# Patient Record
Sex: FEMALE
[De-identification: names, ages, dates, MRNs, and addresses within clinical notes are randomized per-mention and may not be internally consistent; named-entity substitution may affect disease eponyms.]

---

## 2022-03-06 ENCOUNTER — HOSPITAL ENCOUNTER (EMERGENCY)
Dept: HOSPITAL 5 - ED | Age: 52
Discharge: HOME | End: 2022-03-06
Payer: SELF-PAY

## 2022-03-06 VITALS — DIASTOLIC BLOOD PRESSURE: 96 MMHG | SYSTOLIC BLOOD PRESSURE: 150 MMHG

## 2022-03-06 DIAGNOSIS — R60.0: Primary | ICD-10-CM

## 2022-03-06 DIAGNOSIS — R53.81: ICD-10-CM

## 2022-03-06 DIAGNOSIS — N34.2: ICD-10-CM

## 2022-03-06 DIAGNOSIS — R31.29: ICD-10-CM

## 2022-03-06 LAB
BILIRUB UR QL STRIP: (no result)
BLOOD UR QL VISUAL: (no result)
BUN SERPL-MCNC: 16 MG/DL (ref 7–17)
BUN/CREAT SERPL: 27 %
CALCIUM SERPL-MCNC: 9.7 MG/DL (ref 8.4–10.2)
HCT VFR BLD CALC: 36.4 % (ref 30.3–42.9)
HEMOLYSIS INDEX: 9
HGB BLD-MCNC: 12.8 GM/DL (ref 10.1–14.3)
MCHC RBC AUTO-ENTMCNC: 35 % (ref 30–34)
MCV RBC AUTO: 88 FL (ref 79–97)
PH UR STRIP: 6 [PH] (ref 5–7)
PLATELET # BLD: 268 K/MM3 (ref 140–440)
PROT UR STRIP-MCNC: (no result) MG/DL
RBC # BLD AUTO: 4.13 M/MM3 (ref 3.65–5.03)
RBC #/AREA URNS HPF: 8 /HPF (ref 0–6)
UROBILINOGEN UR-MCNC: < 2 MG/DL (ref ?–2)
WBC #/AREA URNS HPF: 1 /HPF (ref 0–6)

## 2022-03-06 PROCEDURE — 80048 BASIC METABOLIC PNL TOTAL CA: CPT

## 2022-03-06 PROCEDURE — 93971 EXTREMITY STUDY: CPT

## 2022-03-06 PROCEDURE — 85027 COMPLETE CBC AUTOMATED: CPT

## 2022-03-06 PROCEDURE — 36415 COLL VENOUS BLD VENIPUNCTURE: CPT

## 2022-03-06 PROCEDURE — 81001 URINALYSIS AUTO W/SCOPE: CPT

## 2022-03-06 PROCEDURE — 99284 EMERGENCY DEPT VISIT MOD MDM: CPT
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
## 2022-03-06 NOTE — VASCULAR LAB REPORT
DUPLEX DOPPLER LOWER EXTREMITY VEINS, RIGHT



INDICATION / CLINICAL INFORMATION:

pain/swelling.



TECHNIQUE:

Duplex doppler imaging was performed through the veins of the right lower extremity using venous comp
ression and other maneuvers.



COMPARISON: 

None available.



FINDINGS:



RIGHT COMMON FEMORAL VEIN: Negative.

RIGHT FEMORAL VEIN: Negative.

RIGHT POPLITEAL VEIN: Negative.

RIGHT CALF VEINS: Negative.



ADDITIONAL FINDINGS: None.



IMPRESSION:

1. No sonographic evidence for DVT in the right lower extremity.



Signer Name: Mikal Wiggins MD 

Signed: 3/6/2022 11:02 AM

Workstation Name: [id number]

## 2022-08-08 ENCOUNTER — HOSPITAL ENCOUNTER (EMERGENCY)
Dept: HOSPITAL 5 - ED | Age: 52
LOS: 1 days | Discharge: LEFT BEFORE DISCHARGE | End: 2022-08-09
Payer: SELF-PAY

## 2022-08-08 VITALS — DIASTOLIC BLOOD PRESSURE: 90 MMHG | SYSTOLIC BLOOD PRESSURE: 130 MMHG

## 2022-08-08 DIAGNOSIS — R10.9: Primary | ICD-10-CM

## 2022-08-08 DIAGNOSIS — Z53.21: ICD-10-CM

## 2022-08-10 ENCOUNTER — HOSPITAL ENCOUNTER (INPATIENT)
Dept: HOSPITAL 5 - ED | Age: 52
LOS: 4 days | Discharge: HOME | DRG: 286 | End: 2022-08-14
Attending: INTERNAL MEDICINE | Admitting: INTERNAL MEDICINE
Payer: SELF-PAY

## 2022-08-10 DIAGNOSIS — F17.200: ICD-10-CM

## 2022-08-10 DIAGNOSIS — I50.21: ICD-10-CM

## 2022-08-10 DIAGNOSIS — I11.0: Primary | ICD-10-CM

## 2022-08-10 DIAGNOSIS — Z90.49: ICD-10-CM

## 2022-08-10 DIAGNOSIS — Z83.3: ICD-10-CM

## 2022-08-10 DIAGNOSIS — Z82.49: ICD-10-CM

## 2022-08-10 DIAGNOSIS — R74.01: ICD-10-CM

## 2022-08-10 LAB
ALBUMIN SERPL-MCNC: 3.5 G/DL (ref 3.9–5)
ALBUMIN SERPL-MCNC: 3.7 G/DL (ref 3.9–5)
ALT SERPL-CCNC: 476 UNITS/L (ref 7–56)
ALT SERPL-CCNC: 579 UNITS/L (ref 7–56)
BASOPHILS # (AUTO): 0.1 K/MM3 (ref 0–0.1)
BASOPHILS NFR BLD AUTO: 0.9 % (ref 0–1.8)
BILIRUB DIRECT SERPL-MCNC: 0.6 MG/DL (ref 0–0.2)
BILIRUB UR QL STRIP: (no result)
BUN SERPL-MCNC: 15 MG/DL (ref 7–17)
BUN/CREAT SERPL: 21 %
CALCIUM SERPL-MCNC: 8.9 MG/DL (ref 8.4–10.2)
EOSINOPHIL # BLD AUTO: 0 K/MM3 (ref 0–0.4)
EOSINOPHIL NFR BLD AUTO: 0.3 % (ref 0–4.3)
HCT VFR BLD CALC: 39.4 % (ref 30.3–42.9)
HEMOLYSIS INDEX: 3
HGB BLD-MCNC: 13 GM/DL (ref 10.1–14.3)
LYMPHOCYTES # BLD AUTO: 2.2 K/MM3 (ref 1.2–5.4)
LYMPHOCYTES NFR BLD AUTO: 23.4 % (ref 13.4–35)
MCHC RBC AUTO-ENTMCNC: 33 % (ref 30–34)
MCV RBC AUTO: 89 FL (ref 79–97)
MONOCYTES # (AUTO): 1 K/MM3 (ref 0–0.8)
MONOCYTES % (AUTO): 11 % (ref 0–7.3)
PH UR STRIP: 5 [PH] (ref 5–7)
PLATELET # BLD: 342 K/MM3 (ref 140–440)
RBC # BLD AUTO: 4.44 M/MM3 (ref 3.65–5.03)
RBC #/AREA URNS HPF: < 1 /HPF (ref 0–6)
UROBILINOGEN UR-MCNC: 0 MG/DL (ref ?–2)
WBC #/AREA URNS HPF: < 1 /HPF (ref 0–6)

## 2022-08-10 PROCEDURE — 36415 COLL VENOUS BLD VENIPUNCTURE: CPT

## 2022-08-10 PROCEDURE — 71045 X-RAY EXAM CHEST 1 VIEW: CPT

## 2022-08-10 PROCEDURE — 80076 HEPATIC FUNCTION PANEL: CPT

## 2022-08-10 PROCEDURE — C1894 INTRO/SHEATH, NON-LASER: HCPCS

## 2022-08-10 PROCEDURE — 93306 TTE W/DOPPLER COMPLETE: CPT

## 2022-08-10 PROCEDURE — 86850 RBC ANTIBODY SCREEN: CPT

## 2022-08-10 PROCEDURE — 85610 PROTHROMBIN TIME: CPT

## 2022-08-10 PROCEDURE — 93458 L HRT ARTERY/VENTRICLE ANGIO: CPT

## 2022-08-10 PROCEDURE — 80048 BASIC METABOLIC PNL TOTAL CA: CPT

## 2022-08-10 PROCEDURE — 83690 ASSAY OF LIPASE: CPT

## 2022-08-10 PROCEDURE — 85730 THROMBOPLASTIN TIME PARTIAL: CPT

## 2022-08-10 PROCEDURE — 80053 COMPREHEN METABOLIC PANEL: CPT

## 2022-08-10 PROCEDURE — 85025 COMPLETE CBC W/AUTO DIFF WBC: CPT

## 2022-08-10 PROCEDURE — 86900 BLOOD TYPING SEROLOGIC ABO: CPT

## 2022-08-10 PROCEDURE — C8929 TTE W OR WO FOL WCON,DOPPLER: HCPCS

## 2022-08-10 PROCEDURE — 84484 ASSAY OF TROPONIN QUANT: CPT

## 2022-08-10 PROCEDURE — 82962 GLUCOSE BLOOD TEST: CPT

## 2022-08-10 PROCEDURE — 93005 ELECTROCARDIOGRAM TRACING: CPT

## 2022-08-10 PROCEDURE — 74177 CT ABD & PELVIS W/CONTRAST: CPT

## 2022-08-10 PROCEDURE — 83880 ASSAY OF NATRIURETIC PEPTIDE: CPT

## 2022-08-10 PROCEDURE — 86901 BLOOD TYPING SEROLOGIC RH(D): CPT

## 2022-08-10 PROCEDURE — 81001 URINALYSIS AUTO W/SCOPE: CPT

## 2022-08-10 PROCEDURE — 80074 ACUTE HEPATITIS PANEL: CPT

## 2022-08-10 RX ADMIN — MORPHINE SULFATE PRN MG: 2 INJECTION, SOLUTION INTRAMUSCULAR; INTRAVENOUS at 23:13

## 2022-08-10 RX ADMIN — VALSARTAN SCH MG: 160 TABLET, FILM COATED ORAL at 22:48

## 2022-08-10 RX ADMIN — FAMOTIDINE SCH MG: 10 INJECTION, SOLUTION INTRAVENOUS at 22:49

## 2022-08-10 RX ADMIN — HEPARIN SODIUM SCH UNIT: 5000 INJECTION, SOLUTION INTRAVENOUS; SUBCUTANEOUS at 22:49

## 2022-08-10 RX ADMIN — Medication SCH ML: at 22:49

## 2022-08-10 NOTE — EMERGENCY DEPARTMENT REPORT
ED Abdominal Pain HPI





- General


Chief Complaint: Abdominal Pain


Stated Complaint: ABDOMINAL PAIN


Time Seen by Provider: 08/10/22 09:36


Source: patient, EMS


Mode of arrival: Stretcher


Limitations: No Limitations





- History of Present Illness


Initial Comments: 





52-year-old female with surgical history of gallbladder removal, spinal fusion, 

hysterectomy reports to the ER with upper abdominal pain for 3 to weeks to 1 

month.  Patient reports the pain has increased over the last few days with 

increased nausea and vomiting.  Patient reports her pain is 8 out of 10.  

Patient denies any suspicious food intake, no injury to abdomen area.  Patient 

denies any consumption of alcohol.  Patient denies weakness, dizziness, 

headaches, fever.


No other acute symptoms reported this time.





- Related Data


                                  Previous Rx's











 Medication  Instructions  Recorded  Last Taken  Type


 


Ibuprofen [Motrin] 800 mg PO Q8HR PRN #20 tablet 03/06/22 Unknown Rx


 


Phenazopyridine [Pyridium] 200 mg PO TID #9 tab 03/06/22 Unknown Rx











                                    Allergies











Allergy/AdvReac Type Severity Reaction Status Date / Time


 


Sulfa (Sulfonamide Allergy Intermediate Hives Verified 08/10/22 08:42





Antibiotics)     














ED Review of Systems


ROS: 


Stated complaint: ABDOMINAL PAIN


Other details as noted in HPI





Comment: All other systems reviewed and negative


Gastrointestinal: abdominal pain, nausea, vomiting.  denies: diarrhea, 

constipation





ED Past Medical Hx





- Past Medical History


Previous Medical History?: No


Hx Hypertension: No


Hx Congestive Heart Failure: No


Hx Deep Vein Thrombosis: No





- Surgical History


Hx Cholecystectomy: Yes


Additional Surgical History: spine fusion





- Social History


Smoking Status: Current Every Day Smoker





- Medications


Home Medications: 


                                Home Medications











 Medication  Instructions  Recorded  Confirmed  Last Taken  Type


 


Ibuprofen [Motrin] 800 mg PO Q8HR PRN #20 tablet 03/06/22  Unknown Rx


 


Phenazopyridine [Pyridium] 200 mg PO TID #9 tab 03/06/22  Unknown Rx














ED Physical Exam





- General


Limitations: No Limitations


General appearance: alert, in no apparent distress





- Head


Head exam: Present: atraumatic, normocephalic





- Eye


Eye exam: Present: normal appearance





- ENT


ENT exam: Present: mucous membranes moist





- Neck


Neck exam: Present: normal inspection





- Respiratory


Respiratory exam: Present: normal lung sounds bilaterally.  Absent: respiratory 

distress





- Cardiovascular


Cardiovascular Exam: Present: regular rate, normal rhythm.  Absent: systolic 

murmur, diastolic murmur, rubs, gallop





- GI/Abdominal


GI/Abdominal exam: Present: soft, distended, tenderness, normal bowel sounds.  

Absent: guarding, rebound





- Extremities Exam


Extremities exam: Present: normal inspection





- Back Exam


Back exam: Present: normal inspection





- Neurological Exam


Neurological exam: Present: alert, oriented X3





- Psychiatric


Psychiatric exam: Present: normal affect, normal mood





- Skin


Skin exam: Present: warm, dry, intact, normal color.  Absent: rash





ED Course


                                   Vital Signs











  08/10/22 08/10/22 08/10/22





  08:40 18:10 18:15


 


Temperature 98.1 F  


 


Pulse Rate 110 H 111 H 110 H


 


Respiratory 14 20 20





Rate   


 


Blood Pressure   167/128


 


Blood Pressure 171/140  





[Left]   


 


O2 Sat by Pulse 98 96 100





Oximetry   














  08/10/22 08/10/22 08/10/22





  18:26 18:30 18:41


 


Temperature   


 


Pulse Rate 111 H 110 H 


 


Respiratory 17 18 17





Rate   


 


Blood Pressure  158/114 


 


Blood Pressure 167/128  





[Left]   


 


O2 Sat by Pulse 100 99 99





Oximetry   














  08/10/22 08/10/22 08/10/22





  18:45 19:01 19:16


 


Temperature   


 


Pulse Rate 109 H  


 


Respiratory 20  





Rate   


 


Blood Pressure 156/105 158/114 156/105


 


Blood Pressure   





[Left]   


 


O2 Sat by Pulse 98 95 94





Oximetry   














  08/10/22 08/10/22 08/10/22





  19:35 19:37 19:52


 


Temperature 98.2 F  


 


Pulse Rate 100 H  


 


Respiratory 18  





Rate   


 


Blood Pressure  151/108 156/105


 


Blood Pressure 151/101  





[Left]   


 


O2 Sat by Pulse 99 97 99





Oximetry   














  08/10/22





  20:01


 


Temperature 


 


Pulse Rate 104 H


 


Respiratory 20





Rate 


 


Blood Pressure 158/114


 


Blood Pressure 





[Left] 


 


O2 Sat by Pulse 97





Oximetry 














ED Medical Decision Making





- Lab Data


Result diagrams: 


                                 08/10/22 09:50





                                 08/10/22 09:50





- Medical Decision Making





52-year-old female with no significant past medical history reports to the ER 

with right upper quadrant pain for about 3 to 3 weeks to 1 month.


On physical exam there is right upper quadrant tenderness negative Grimes sign. 

 Some epigastric pain.  Slight abdominal distention.


Patient has elevated BNP at about 11,900


Patient has elevated LFTs with an AST of 603 ALT was 476 and a bilirubin of 1.3


Chest x-ray shows enlargement of the heart.


CT abdomen pelvis with contrast shows mild cardiomegaly with mild apparent 

medically with passive congestion associated with possible CHF.





[facility] medicine spoke with Dr. Minor for admission for new onset of CHF 

and elevated LFTs.


Dr. Minor accepted admission with a diagnosis of CHF and elevated LFTs.








                                   Vital Signs











  08/10/22 08/10/22 08/10/22





  08:40 18:10 18:15


 


Temperature 98.1 F  


 


Pulse Rate 110 H 111 H 110 H


 


Respiratory 14 20 20





Rate   


 


Blood Pressure   167/128


 


Blood Pressure 171/140  





[Left]   


 


O2 Sat by Pulse 98 96 100





Oximetry   














  08/10/22 08/10/22 08/10/22





  18:26 18:30 18:41


 


Temperature   


 


Pulse Rate 111 H 110 H 


 


Respiratory 17 18 17





Rate   


 


Blood Pressure  158/114 


 


Blood Pressure 167/128  





[Left]   


 


O2 Sat by Pulse 100 99 99





Oximetry   














  08/10/22 08/10/22 08/10/22





  18:45 19:01 19:16


 


Temperature   


 


Pulse Rate 109 H  


 


Respiratory 20  





Rate   


 


Blood Pressure 156/105 158/114 156/105


 


Blood Pressure   





[Left]   


 


O2 Sat by Pulse 98 95 94





Oximetry   














  08/10/22 08/10/22 08/10/22





  19:35 19:37 19:52


 


Temperature 98.2 F  


 


Pulse Rate 100 H  


 


Respiratory 18  





Rate   


 


Blood Pressure  151/108 156/105


 


Blood Pressure 151/101  





[Left]   


 


O2 Sat by Pulse 99 97 99





Oximetry   














  08/10/22





  20:01


 


Temperature 


 


Pulse Rate 104 H


 


Respiratory 20





Rate 


 


Blood Pressure 158/114


 


Blood Pressure 





[Left] 


 


O2 Sat by Pulse 97





Oximetry 











                                   Lab Results











  08/10/22 08/10/22 08/10/22 Range/Units





  09:50 09:50 10:12 


 


WBC  9.4    (4.5-11.0)  K/mm3


 


RBC  4.44    (3.65-5.03)  M/mm3


 


Hgb  13.0    (10.1-14.3)  gm/dl


 


Hct  39.4    (30.3-42.9)  %


 


MCV  89    (79-97)  fl


 


MCH  29    (28-32)  pg


 


MCHC  33    (30-34)  %


 


RDW  14.6    (13.2-15.2)  %


 


Plt Count  342    (140-440)  K/mm3


 


Lymph % (Auto)  23.4    (13.4-35.0)  %


 


Mono % (Auto)  11.0 H    (0.0-7.3)  %


 


Eos % (Auto)  0.3    (0.0-4.3)  %


 


Baso % (Auto)  0.9    (0.0-1.8)  %


 


Lymph # (Auto)  2.2    (1.2-5.4)  K/mm3


 


Mono # (Auto)  1.0 H    (0.0-0.8)  K/mm3


 


Eos # (Auto)  0.0    (0.0-0.4)  K/mm3


 


Baso # (Auto)  0.1    (0.0-0.1)  K/mm3


 


Seg Neutrophils %  64.4    (40.0-70.0)  %


 


Seg Neutrophils #  6.1    (1.8-7.7)  K/mm3


 


Sodium   136 L   (137-145)  mmol/L


 


Potassium   4.4   (3.6-5.0)  mmol/L


 


Chloride   102.2   ()  mmol/L


 


Carbon Dioxide   22   (22-30)  mmol/L


 


Anion Gap   16   mmol/L


 


BUN   15   (7-17)  mg/dL


 


Creatinine   0.7   (0.6-1.2)  mg/dL


 


Estimated GFR   > 60   ml/min


 


BUN/Creatinine Ratio   21   %


 


Glucose   96   ()  mg/dL


 


Calcium   8.9   (8.4-10.2)  mg/dL


 


Total Bilirubin   1.30 H   (0.1-1.2)  mg/dL


 


Direct Bilirubin     (0-0.2)  mg/dL


 


Indirect Bilirubin     mg/dL


 


AST   603 H   (5-40)  units/L


 


ALT   476 H   (7-56)  units/L


 


Alkaline Phosphatase   166 H   ()  units/L


 


Troponin T     (0.00-0.029)  ng/mL


 


NT-Pro-B Natriuret Pep     (0-900)  pg/mL


 


Total Protein   6.2 L   (6.3-8.2)  g/dL


 


Albumin   3.7 L   (3.9-5)  g/dL


 


Albumin/Globulin Ratio   1.5   %


 


Lipase   15   (13-60)  units/L


 


Urine Color    Rita  (Yellow)  


 


Urine Turbidity    Clear  (Clear)  


 


Urine pH    5.0  (5.0-7.0)  


 


Ur Specific Gravity    1.030  (1.003-1.030)  


 


Urine Protein    30 mg/dl  (Negative)  mg/dL


 


Urine Glucose (UA)    Negative  (Negative)  mg/dL


 


Urine Ketones    2+  (Negative)  mg/dL


 


Urine Blood    Negative  (Negative)  


 


Urine Nitrite    Negative  (Negative)  


 


Urine Bilirubin    1+  (Negative)  


 


Urine Ictotest    Negative  (Negative)  


 


Urine Urobilinogen    0.0  (<2.0)  mg/dL


 


Ur Leukocyte Esterase    Negative  (Negative)  


 


Urine WBC (Auto)    < 1.0  (0.0-6.0)  /HPF


 


Urine RBC (Auto)    < 1.0  (0.0-6.0)  /HPF


 


U Epithel Cells (Auto)    15.0 H  (0-13.0)  /HPF


 


Hepatitis A IgM Ab     (NonReactive)  


 


Hep Bs Antigen     (Negative)  


 


Hep B Core IgM Ab     (NonReactive)  


 


Hepatitis C Antibody     (NonReactive)  














  08/10/22 08/10/22 08/10/22 Range/Units





  17:18 17:18 19:20 


 


WBC     (4.5-11.0)  K/mm3


 


RBC     (3.65-5.03)  M/mm3


 


Hgb     (10.1-14.3)  gm/dl


 


Hct     (30.3-42.9)  %


 


MCV     (79-97)  fl


 


MCH     (28-32)  pg


 


MCHC     (30-34)  %


 


RDW     (13.2-15.2)  %


 


Plt Count     (140-440)  K/mm3


 


Lymph % (Auto)     (13.4-35.0)  %


 


Mono % (Auto)     (0.0-7.3)  %


 


Eos % (Auto)     (0.0-4.3)  %


 


Baso % (Auto)     (0.0-1.8)  %


 


Lymph # (Auto)     (1.2-5.4)  K/mm3


 


Mono # (Auto)     (0.0-0.8)  K/mm3


 


Eos # (Auto)     (0.0-0.4)  K/mm3


 


Baso # (Auto)     (0.0-0.1)  K/mm3


 


Seg Neutrophils %     (40.0-70.0)  %


 


Seg Neutrophils #     (1.8-7.7)  K/mm3


 


Sodium     (137-145)  mmol/L


 


Potassium     (3.6-5.0)  mmol/L


 


Chloride     ()  mmol/L


 


Carbon Dioxide     (22-30)  mmol/L


 


Anion Gap     mmol/L


 


BUN     (7-17)  mg/dL


 


Creatinine     (0.6-1.2)  mg/dL


 


Estimated GFR     ml/min


 


BUN/Creatinine Ratio     %


 


Glucose     ()  mg/dL


 


Calcium     (8.4-10.2)  mg/dL


 


Total Bilirubin    1.50 H  (0.1-1.2)  mg/dL


 


Direct Bilirubin    0.6 H  (0-0.2)  mg/dL


 


Indirect Bilirubin    0.9  mg/dL


 


AST     (5-40)  units/L


 


ALT    579 H  (7-56)  units/L


 


Alkaline Phosphatase    169 H  ()  units/L


 


Troponin T   < 0.010   (0.00-0.029)  ng/mL


 


NT-Pro-B Natriuret Pep  28877 H    (0-900)  pg/mL


 


Total Protein    6.4  (6.3-8.2)  g/dL


 


Albumin    3.5 L  (3.9-5)  g/dL


 


Albumin/Globulin Ratio    1.2  %


 


Lipase     (13-60)  units/L


 


Urine Color     (Yellow)  


 


Urine Turbidity     (Clear)  


 


Urine pH     (5.0-7.0)  


 


Ur Specific Gravity     (1.003-1.030)  


 


Urine Protein     (Negative)  mg/dL


 


Urine Glucose (UA)     (Negative)  mg/dL


 


Urine Ketones     (Negative)  mg/dL


 


Urine Blood     (Negative)  


 


Urine Nitrite     (Negative)  


 


Urine Bilirubin     (Negative)  


 


Urine Ictotest     (Negative)  


 


Urine Urobilinogen     (<2.0)  mg/dL


 


Ur Leukocyte Esterase     (Negative)  


 


Urine WBC (Auto)     (0.0-6.0)  /HPF


 


Urine RBC (Auto)     (0.0-6.0)  /HPF


 


U Epithel Cells (Auto)     (0-13.0)  /HPF


 


Hepatitis A IgM Ab     (NonReactive)  


 


Hep Bs Antigen     (Negative)  


 


Hep B Core IgM Ab     (NonReactive)  


 


Hepatitis C Antibody     (NonReactive)  














  08/10/22 Range/Units





  19:20 


 


WBC   (4.5-11.0)  K/mm3


 


RBC   (3.65-5.03)  M/mm3


 


Hgb   (10.1-14.3)  gm/dl


 


Hct   (30.3-42.9)  %


 


MCV   (79-97)  fl


 


MCH   (28-32)  pg


 


MCHC   (30-34)  %


 


RDW   (13.2-15.2)  %


 


Plt Count   (140-440)  K/mm3


 


Lymph % (Auto)   (13.4-35.0)  %


 


Mono % (Auto)   (0.0-7.3)  %


 


Eos % (Auto)   (0.0-4.3)  %


 


Baso % (Auto)   (0.0-1.8)  %


 


Lymph # (Auto)   (1.2-5.4)  K/mm3


 


Mono # (Auto)   (0.0-0.8)  K/mm3


 


Eos # (Auto)   (0.0-0.4)  K/mm3


 


Baso # (Auto)   (0.0-0.1)  K/mm3


 


Seg Neutrophils %   (40.0-70.0)  %


 


Seg Neutrophils #   (1.8-7.7)  K/mm3


 


Sodium   (137-145)  mmol/L


 


Potassium   (3.6-5.0)  mmol/L


 


Chloride   ()  mmol/L


 


Carbon Dioxide   (22-30)  mmol/L


 


Anion Gap   mmol/L


 


BUN   (7-17)  mg/dL


 


Creatinine   (0.6-1.2)  mg/dL


 


Estimated GFR   ml/min


 


BUN/Creatinine Ratio   %


 


Glucose   ()  mg/dL


 


Calcium   (8.4-10.2)  mg/dL


 


Total Bilirubin   (0.1-1.2)  mg/dL


 


Direct Bilirubin   (0-0.2)  mg/dL


 


Indirect Bilirubin   mg/dL


 


AST   (5-40)  units/L


 


ALT   (7-56)  units/L


 


Alkaline Phosphatase   ()  units/L


 


Troponin T   (0.00-0.029)  ng/mL


 


NT-Pro-B Natriuret Pep   (0-900)  pg/mL


 


Total Protein   (6.3-8.2)  g/dL


 


Albumin   (3.9-5)  g/dL


 


Albumin/Globulin Ratio   %


 


Lipase   (13-60)  units/L


 


Urine Color   (Yellow)  


 


Urine Turbidity   (Clear)  


 


Urine pH   (5.0-7.0)  


 


Ur Specific Gravity   (1.003-1.030)  


 


Urine Protein   (Negative)  mg/dL


 


Urine Glucose (UA)   (Negative)  mg/dL


 


Urine Ketones   (Negative)  mg/dL


 


Urine Blood   (Negative)  


 


Urine Nitrite   (Negative)  


 


Urine Bilirubin   (Negative)  


 


Urine Ictotest   (Negative)  


 


Urine Urobilinogen   (<2.0)  mg/dL


 


Ur Leukocyte Esterase   (Negative)  


 


Urine WBC (Auto)   (0.0-6.0)  /HPF


 


Urine RBC (Auto)   (0.0-6.0)  /HPF


 


U Epithel Cells (Auto)   (0-13.0)  /HPF


 


Hepatitis A IgM Ab  Non-reactive  (NonReactive)  


 


Hep Bs Antigen  Non-reactive  (Negative)  


 


Hep B Core IgM Ab  Non-reactive  (NonReactive)  


 


Hepatitis C Antibody  Non-reactive  (NonReactive)  











Critical care attestation.: 


If time is entered above; I have spent that time in minutes in the direct care 

of this critically ill patient, excluding procedure time.








ED Disposition


Clinical Impression: 


 New onset of congestive heart failure, Elevated liver enzymes





Disposition: 09 ADMITTED AS INPATIENT


Is pt being admited?: Yes


Condition: Stable


Instructions:  Abdominal Pain (ED)

## 2022-08-10 NOTE — CAT SCAN REPORT
CT ABDOMEN AND PELVIS WITH CONTRAST



INDICATION / CLINICAL INFORMATION: abdominal pain 100ml of [id number] per pt having upper Right side gaston
n, also having back pain and has a hx of kidney stones.



TECHNIQUE: Axial CT images were obtained through the abdomen and pelvis after IV contrast.  All CT sc
ans at this location are performed using CT dose reduction for ALARA by means of automated exposure c
ontrol. 



COMPARISON: None available.



FINDINGS:

LOWER CHEST: Mild cardiomegaly with left ventricular chamber dilation. Moderate volume right and smal
l left pleural effusions with right greater than left basilar subsegmental atelectasis.

LIVER: Mild hepatomegaly with heterogeneous enhancement, which may be secondary to passive congestion
 associated with CHF. No capsular nodularity to suggest cirrhosis. No suspicious focal hepatic mass. 
The portal vein is patent.

GALLBLADDER/BILIARY: Cholecystectomy.  

PANCREAS: No significant abnormality.

SPLEEN: No significant abnormality.

ADRENALS: No significant abnormality.

KIDNEYS/URETERS: No urolithiasis, hydronephrosis, solid renal mass or other significant abnormality.



GI: No acute bowel inflammation, obstruction or evidence for ischemia. 

APPENDIX: No significant abnormality.  

PERITONEUM: No pneumoperitoneum, free peritoneal fluid or loculated fluid collection. 



LYMPH NODES: No significant adenopathy.

AORTA / ARTERIES: No significant abnormality. 



URINARY BLADDER: No significant abnormality.

REPRODUCTIVE ORGANS: Uterus is absent. No significant adnexal abnormality.



SKELETAL SYSTEM: Postoperative changes from posterior element decompression and fusion spanning L4-S1
. Hardware has been removed at L5-S1.

ADDITIONAL FINDINGS: None.



IMPRESSION:

1. No acute abdominopelvic process.

2. Right greater than left pleural effusions and suspected passive hepatic congestion associated with
 chronic congestive heart failure.

3. Other postoperative incidental findings, as detailed above. 



Signer Name: Rishabh Jin MD 

Signed: 8/10/2022 3:44 PM

Workstation Name: [email or web address]

## 2022-08-10 NOTE — XRAY REPORT
CHEST 1 VIEW



INDICATION: 

abnormal CT findings of chest.



COMPARISON: 

None.



FINDINGS:



Support devices: None.

Heart: Enlarged. 

Lungs/Pleura: Small right and trace left effusions. Bibasilar opacities may be atelectatic. Left lung
 is essentially clear. No pneumothorax.  







IMPRESSION:

1. Small right and trace left pleural effusions with atelectatic changes in the right lung base.

 



Signer Name: Ovi Figueroa MD 

Signed: 8/10/2022 5:29 PM

Workstation Name: [email or web address]

## 2022-08-10 NOTE — HISTORY AND PHYSICAL REPORT
History of Present Illness


Date of examination: 08/10/22


Date of admission: 


8/10/2022


Chief complaint: 


Chest pain and cough for 1 day





History of present illness: 


52-year-old  female with no significant past medical history comes in 

for increasing shortness of breath for 2 weeks.  Shortness of breath on minimal 

exertion and orthopnea present.  No chest pain.  Patient also has right upper 

quadrant pain.  Patient had cholecystectomy in the past and


Hysterectomy no fever or chills.  Patient was seen now for 10 minutes/3/20/2021 

3 years ago.  Not on any narcotics or opiates from previous 3 years ago.  No 

alcohol.








- Past Medical History


--No





- Surgical History


--Cholecystectomy: Yes


--Spine fusion





- Social History  


--Smoking Status: Current Every Day Smoker





-Family history


 Htn





 








Review of Systems


ROS: 


Stated complaint: ABDOMINAL PAIN


Other details as noted in HPI





Comment: All other systems reviewed and negative


Gastrointestinal: abdominal pain, nausea, vomiting.  denies: diarrhea, 

constipation











Medications and Allergies


                                    Allergies











Allergy/AdvReac Type Severity Reaction Status Date / Time


 


Sulfa (Sulfonamide Allergy Intermediate Hives Verified 08/10/22 21:39





Antibiotics)     











                                Home Medications











 Medication  Instructions  Recorded  Confirmed  Last Taken  Type


 


Ibuprofen [Motrin] 800 mg PO Q8HR PRN #20 tablet 03/06/22 08/11/22 Unknown Rx


 


Phenazopyridine [Pyridium] 200 mg PO TID #9 tab 03/06/22 08/11/22 Unknown Rx














Exam





- Constitutional


Vitals: 


                                        











Temp Pulse Resp BP Pulse Ox


 


 98.2 F   104 H  20   158/114   97 


 


 08/10/22 19:35  08/10/22 20:01  08/10/22 20:01  08/10/22 20:01  08/10/22 20:01











General appearance: Present: no acute distress, well-nourished





- EENT


Eyes: Present: PERRL


ENT: hearing intact, clear oral mucosa





- Neck


Neck: Present: supple, normal ROM





- Respiratory


Respiratory effort: normal


Respiratory: bilateral: CTA





- Cardiovascular


Heart rate: 78


Rhythm: regular


Heart Sounds: Present: S1 & S2.  Absent: rub, click





- Extremities


Extremities: pulses symmetrical, No edema


Peripheral Pulses: within normal limits





- Abdominal


General gastrointestinal: Present: soft, non-tender, non-distended, normal bowel

 sounds


Female genitourinary: Present: normal





- Integumentary


Integumentary: Present: clear, warm, dry





- Musculoskeletal


Musculoskeletal: gait normal, strength equal bilaterally





- Psychiatric


Psychiatric: appropriate mood/affect, intact judgment & insight





- Neurologic


Neurologic: CNII-XII intact, moves all extremities





- Allied Health


Allied health notes reviewed: nursing





HEART Score





- HEART Score


History: Slightly suspicious


Age: 45-65


Risk factors: 1-2 risk factors


Troponin: 


                                        











Troponin T  < 0.010 ng/mL (0.00-0.029)   08/10/22  17:18    











Troponin: < normal limit





- Critical Actions


Critical Actions: 4-6 pts:12-16.6% risk of adverse cardiac event. Should be 

admitted





Results





- Labs


CBC & Chem 7: 


                                 08/11/22 03:48





                                 08/11/22 03:48


Labs: 


                             Laboratory Last Values











WBC  9.4 K/mm3 (4.5-11.0)   08/10/22  09:50    


 


RBC  4.44 M/mm3 (3.65-5.03)   08/10/22  09:50    


 


Hgb  13.0 gm/dl (10.1-14.3)   08/10/22  09:50    


 


Hct  39.4 % (30.3-42.9)   08/10/22  09:50    


 


MCV  89 fl (79-97)   08/10/22  09:50    


 


MCH  29 pg (28-32)   08/10/22  09:50    


 


MCHC  33 % (30-34)   08/10/22  09:50    


 


RDW  14.6 % (13.2-15.2)   08/10/22  09:50    


 


Plt Count  342 K/mm3 (140-440)   08/10/22  09:50    


 


Lymph % (Auto)  23.4 % (13.4-35.0)   08/10/22  09:50    


 


Mono % (Auto)  11.0 % (0.0-7.3)  H  08/10/22  09:50    


 


Eos % (Auto)  0.3 % (0.0-4.3)   08/10/22  09:50    


 


Baso % (Auto)  0.9 % (0.0-1.8)   08/10/22  09:50    


 


Lymph # (Auto)  2.2 K/mm3 (1.2-5.4)   08/10/22  09:50    


 


Mono # (Auto)  1.0 K/mm3 (0.0-0.8)  H  08/10/22  09:50    


 


Eos # (Auto)  0.0 K/mm3 (0.0-0.4)   08/10/22  09:50    


 


Baso # (Auto)  0.1 K/mm3 (0.0-0.1)   08/10/22  09:50    


 


Seg Neutrophils %  64.4 % (40.0-70.0)   08/10/22  09:50    


 


Seg Neutrophils #  6.1 K/mm3 (1.8-7.7)   08/10/22  09:50    


 


Sodium  136 mmol/L (137-145)  L  08/10/22  09:50    


 


Potassium  4.4 mmol/L (3.6-5.0)   08/10/22  09:50    


 


Chloride  102.2 mmol/L ()   08/10/22  09:50    


 


Carbon Dioxide  22 mmol/L (22-30)   08/10/22  09:50    


 


Anion Gap  16 mmol/L  08/10/22  09:50    


 


BUN  15 mg/dL (7-17)   08/10/22  09:50    


 


Creatinine  0.7 mg/dL (0.6-1.2)   08/10/22  09:50    


 


Estimated GFR  > 60 ml/min  08/10/22  09:50    


 


BUN/Creatinine Ratio  21 %  08/10/22  09:50    


 


Glucose  96 mg/dL ()   08/10/22  09:50    


 


Calcium  8.9 mg/dL (8.4-10.2)   08/10/22  09:50    


 


Total Bilirubin  1.50 mg/dL (0.1-1.2)  H  08/10/22  19:20    


 


Direct Bilirubin  0.6 mg/dL (0-0.2)  H  08/10/22  19:20    


 


Indirect Bilirubin  0.9 mg/dL  08/10/22  19:20    


 


AST  815 units/L (5-40)  H  08/10/22  19:20    


 


ALT  579 units/L (7-56)  H  08/10/22  19:20    


 


Alkaline Phosphatase  169 units/L ()  H  08/10/22  19:20    


 


Troponin T  < 0.010 ng/mL (0.00-0.029)   08/10/22  17:18    


 


NT-Pro-B Natriuret Pep  41755 pg/mL (0-900)  H  08/10/22  17:18    


 


Total Protein  6.4 g/dL (6.3-8.2)   08/10/22  19:20    


 


Albumin  3.5 g/dL (3.9-5)  L  08/10/22  19:20    


 


Albumin/Globulin Ratio  1.2 %  08/10/22  19:20    


 


Lipase  15 units/L (13-60)   08/10/22  09:50    


 


Urine Color  Rita  (Yellow)   08/10/22  10:12    


 


Urine Turbidity  Clear  (Clear)   08/10/22  10:12    


 


Urine pH  5.0  (5.0-7.0)   08/10/22  10:12    


 


Ur Specific Gravity  1.030  (1.003-1.030)   08/10/22  10:12    


 


Urine Protein  30 mg/dl mg/dL (Negative)   08/10/22  10:12    


 


Urine Glucose (UA)  Negative mg/dL (Negative)   08/10/22  10:12    


 


Urine Ketones  2+ mg/dL (Negative)   08/10/22  10:12    


 


Urine Blood  Negative  (Negative)   08/10/22  10:12    


 


Urine Nitrite  Negative  (Negative)   08/10/22  10:12    


 


Urine Bilirubin  1+  (Negative)   08/10/22  10:12    


 


Urine Ictotest  Negative  (Negative)   08/10/22  10:12    


 


Urine Urobilinogen  0.0 mg/dL (<2.0)   08/10/22  10:12    


 


Ur Leukocyte Esterase  Negative  (Negative)   08/10/22  10:12    


 


Urine WBC (Auto)  < 1.0 /HPF (0.0-6.0)   08/10/22  10:12    


 


Urine RBC (Auto)  < 1.0 /HPF (0.0-6.0)   08/10/22  10:12    


 


U Epithel Cells (Auto)  15.0 /HPF (0-13.0)  H  08/10/22  10:12    


 


Hepatitis A IgM Ab  Non-reactive  (NonReactive)   08/10/22  19:20    


 


Hep Bs Antigen  Non-reactive  (Negative)   08/10/22  19:20    


 


Hep B Core IgM Ab  Non-reactive  (NonReactive)   08/10/22  19:20    


 


Hepatitis C Antibody  Non-reactive  (NonReactive)   08/10/22  19:20    








                                    Short CBC











  08/10/22 08/11/22 Range/Units





  09:50 03:48 


 


WBC  9.4  7.1  (4.5-11.0)  K/mm3


 


Hgb  13.0  12.7  (10.1-14.3)  gm/dl


 


Hct  39.4  38.4  (30.3-42.9)  %


 


Plt Count  342  286  (140-440)  K/mm3








                                       BMP











  08/10/22 08/11/22





  09:50 03:48


 


Sodium  136 L  136 L


 


Potassium  4.4  4.1


 


Chloride  102.2  98.9


 


Carbon Dioxide  22  26


 


BUN  15  17


 


Creatinine  0.7  0.8


 


Glucose  96  117 H


 


Calcium  8.9  8.6








                                 Cardiac Enzymes











  08/10/22 Range/Units





  17:18 


 


Troponin T  < 0.010  (0.00-0.029)  ng/mL








                                 Liver Function











  08/10/22 08/10/22 08/11/22 Range/Units





  09:50 19:20 03:48 


 


Total Bilirubin  1.30 H  1.50 H  1.00  (0.1-1.2)  mg/dL


 


Direct Bilirubin   0.6 H   (0-0.2)  mg/dL


 


AST  603 H  815 H  516 H  (5-40)  units/L


 


ALT  476 H  579 H  474 H  (7-56)  units/L


 


Alkaline Phosphatase  166 H  169 H  148 H  ()  units/L


 


Albumin  3.7 L  3.5 L  3.3 L  (3.9-5)  g/dL








                                      Urine











  08/10/22 Range/Units





  10:12 


 


Urine Color  Rita  (Yellow)  


 


Urine pH  5.0  (5.0-7.0)  


 


Ur Specific Gravity  1.030  (1.003-1.030)  


 


Urine Protein  30 mg/dl  (Negative)  mg/dL


 


Urine Glucose (UA)  Negative  (Negative)  mg/dL














- Imaging and Cardiology


EKG: report reviewed (Sinus tachycardia heart rate of 54 minutes)


Chest x-ray: report reviewed


Imaging and Cardiology: 





 CT of the abdomen and pelvis





Liver: Mild hepatomegaly with heterogeneous enhancement which may be secondary 

to passive congestion associated with CHF.  No capsular nodularity to suggest 

cirrhosis.  No suspicious focal hepatic mass.  The portal vein is patent.


Cholecystectomy.


No significant significant abnormality of pancreas.


No significant abnormality of diagnosis.


No kidney or ureteral problems.





Final impression no acute abdominopelvic process.


Right greater than left pleural effusions and suspected passive hepatic 

congestion.





Assessment and Plan


Advance Directives: Yes (Full code)


Plan of care discussed with patient/family: Yes





- Patient Problems


(1) Acute exacerbation of CHF (congestive heart failure)


Current Visit: Yes   Status: Acute   


Plan to address problem: 


Daily intake output


Daily weight


IV Lasix


Aldactone


Echocardiogram for ejection fraction and valve abnormalities abnormal


BNP is elevated


Cardiology consult requested








(2) New onset of congestive heart failure


Current Visit: Yes   Status: Acute   


Plan to address problem: 


IV Lasix initiated.


Echocardiogram for ejection fraction and valve


Cardiology consult requested abnormalities and wall motion abnormalities








(3) Transaminitis


Current Visit: Yes   Status: Acute   


Plan to address problem: 


Differential diagnoses are hepatic congestion versus choledocholithiasis


GI consult requested


Hepatitis profile negative


No history of alcoholism


On Tylenol till 3-3 years ago








(4) Advance care planning


Current Visit: Yes   Status: Acute   


Plan to address problem: 


Disease education conducted, care care plan discussed, diagnosis discussed and 

prognosis discussed. Patient acknowledged understanding with care plan.  +30 

minutes.             








(5) DVT prophylaxis


Current Visit: Yes   Status: Acute

## 2022-08-11 LAB
ALBUMIN SERPL-MCNC: 3.3 G/DL (ref 3.9–5)
ALT SERPL-CCNC: 474 UNITS/L (ref 7–56)
BASOPHILS # (AUTO): 0 K/MM3 (ref 0–0.1)
BASOPHILS NFR BLD AUTO: 0.3 % (ref 0–1.8)
BUN SERPL-MCNC: 17 MG/DL (ref 7–17)
BUN/CREAT SERPL: 21 %
CALCIUM SERPL-MCNC: 8.6 MG/DL (ref 8.4–10.2)
EOSINOPHIL # BLD AUTO: 0.1 K/MM3 (ref 0–0.4)
EOSINOPHIL NFR BLD AUTO: 2 % (ref 0–4.3)
HCT VFR BLD CALC: 38.4 % (ref 30.3–42.9)
HEMOLYSIS INDEX: 4
HGB BLD-MCNC: 12.7 GM/DL (ref 10.1–14.3)
LYMPHOCYTES # BLD AUTO: 2.4 K/MM3 (ref 1.2–5.4)
LYMPHOCYTES NFR BLD AUTO: 33.5 % (ref 13.4–35)
MCHC RBC AUTO-ENTMCNC: 33 % (ref 30–34)
MCV RBC AUTO: 89 FL (ref 79–97)
MONOCYTES # (AUTO): 0.7 K/MM3 (ref 0–0.8)
MONOCYTES % (AUTO): 10 % (ref 0–7.3)
PLATELET # BLD: 286 K/MM3 (ref 140–440)
RBC # BLD AUTO: 4.32 M/MM3 (ref 3.65–5.03)

## 2022-08-11 RX ADMIN — Medication SCH ML: at 09:29

## 2022-08-11 RX ADMIN — MORPHINE SULFATE PRN MG: 2 INJECTION, SOLUTION INTRAMUSCULAR; INTRAVENOUS at 04:11

## 2022-08-11 RX ADMIN — FUROSEMIDE SCH MG: 10 INJECTION, SOLUTION INTRAVENOUS at 17:54

## 2022-08-11 RX ADMIN — VALSARTAN SCH: 160 TABLET, FILM COATED ORAL at 21:48

## 2022-08-11 RX ADMIN — HEPARIN SODIUM SCH UNIT: 5000 INJECTION, SOLUTION INTRAVENOUS; SUBCUTANEOUS at 21:48

## 2022-08-11 RX ADMIN — FAMOTIDINE SCH MG: 10 INJECTION, SOLUTION INTRAVENOUS at 09:28

## 2022-08-11 RX ADMIN — HEPARIN SODIUM SCH UNIT: 5000 INJECTION, SOLUTION INTRAVENOUS; SUBCUTANEOUS at 09:28

## 2022-08-11 RX ADMIN — FUROSEMIDE SCH MG: 10 INJECTION, SOLUTION INTRAVENOUS at 05:26

## 2022-08-11 RX ADMIN — MORPHINE SULFATE PRN MG: 2 INJECTION, SOLUTION INTRAMUSCULAR; INTRAVENOUS at 13:54

## 2022-08-11 RX ADMIN — Medication SCH ML: at 21:48

## 2022-08-11 RX ADMIN — MORPHINE SULFATE PRN MG: 2 INJECTION, SOLUTION INTRAMUSCULAR; INTRAVENOUS at 17:57

## 2022-08-11 RX ADMIN — MORPHINE SULFATE PRN MG: 2 INJECTION, SOLUTION INTRAMUSCULAR; INTRAVENOUS at 09:28

## 2022-08-11 RX ADMIN — FAMOTIDINE SCH MG: 10 INJECTION, SOLUTION INTRAVENOUS at 21:48

## 2022-08-11 RX ADMIN — OXYCODONE AND ACETAMINOPHEN PRN TAB: 5; 325 TABLET ORAL at 11:54

## 2022-08-11 NOTE — CONSULTATION
History of Present Illness


Consult date: 08/11/22


Requesting physician: ROLO BUTTS


Consult reason: congestive heart failure


History of present illness: 





Patient is a 52-year-old female who reports a past medical history of 

degenerative joint disease, chronic back pain, and history of crack cocaine 

abuse(reports she has not used since October) who presents to the ED yesterday 

for complaint of weakness, dyspnea on exertion, and abdominal pain that has been

going on for about a month.  She states the pain became too severe that she had 

to come to be further evaluated.  She describes her abdominal pain as sharp 

stabbing, worse with palpation, and locates it in the upper right quadrant.  She

also states that she develops lower extremity edema In the ED patient was found 

to have elevated BPs, elevated BNP and elevated LFTs.  Abdominal CT shows 

suspected passive hepatic congestion associated with chronic CHF.  At time of 

interview patient denies chest pain, palpitations, nausea, vomiting, 

diaphoresis.  Patient is previously unknown to our practice.  Cardiology is 

consulted for CHF.





Past History


Past Medical History: other (SEE HPI)


Past Surgical History: cholecystectomy


Social history: other (h/o crack coacaine use)


Family history: cancer, diabetes, hypertension





Medications and Allergies


                                    Allergies











Allergy/AdvReac Type Severity Reaction Status Date / Time


 


Sulfa (Sulfonamide Allergy Intermediate Hives Verified 08/10/22 21:39





Antibiotics)     











                                Home Medications











 Medication  Instructions  Recorded  Confirmed  Last Taken  Type


 


Ibuprofen [Motrin] 800 mg PO Q8HR PRN #20 tablet 03/06/22 08/11/22 Unknown Rx


 


Phenazopyridine [Pyridium] 200 mg PO TID #9 tab 03/06/22 08/11/22 Unknown Rx











Active Meds: 


Active Medications





Acetaminophen (Acetaminophen 325 Mg Tab)  650 mg PO Q4H PRN


   PRN Reason: Pain MILD(1-3)/Fever >100.5/HA


Carvedilol (Carvedilol 6.25 Mg Tab)  12.5 mg PO BID [facility]


   Last Admin: 08/11/22 09:28 Dose:  12.5 mg


   


Famotidine (Famotidine 20 Mg/2 Ml Inj)  20 mg IV BID [facility]


   Last Admin: 08/11/22 09:28 Dose:  20 mg


   


Furosemide (Furosemide 40 Mg/4 Ml Inj)  40 mg IV 0600,1800 [facility]


   Last Admin: 08/11/22 05:26 Dose:  40 mg


   


Heparin Sodium (Porcine) (Heparin 5,000 Unit/1 Ml Vial)  5,000 unit SUB-Q Q12HR 

[facility]


   Last Admin: 08/11/22 09:28 Dose:  5,000 unit


   


Metoclopramide HCl (Metoclopramide 10 Mg/2 Ml Inj)  10 mg IV Q6H PRN


   PRN Reason: Nausea And Vomiting


Morphine Sulfate (Morphine 2 Mg/1 Ml Inj)  2 mg IV Q4H PRN


   PRN Reason: Pain, Moderate (4-6)


   Last Admin: 08/11/22 13:54 Dose:  2 mg


   


Ondansetron HCl (Ondansetron 4 Mg/2 Ml Inj)  4 mg IV Q8H PRN


   PRN Reason: Nausea And Vomiting


Oxycodone/Acetaminophen (Oxycodone /Acetaminophen 5-325mg Tab)  1 tab PO Q6H PRN


   PRN Reason: Pain, Moderate (4-6)


   Last Admin: 08/11/22 11:54 Dose:  1 tab


   


Sodium Chloride (Sodium Chloride 0.9% 10 Ml Flush Syringe)  10 ml IV BID [facility]


   Last Admin: 08/11/22 09:29 Dose:  10 ml


   


Sodium Chloride (Sodium Chloride 0.9% 10 Ml Flush Syringe)  10 ml IV PRN PRN


   PRN Reason: LINE FLUSH


   Last Admin: 08/11/22 04:12 Dose:  10 ml


   


Valsartan (Valsartan 160mg Tab)  160 mg PO Q24H [facility]


   Last Admin: 08/10/22 22:48 Dose:  160 mg


   











Review of Systems


Constitutional: weakness, no weight loss, no weight gain


Ears, nose, mouth and throat: no sinus pressure, no sinus pain


Cardiovascular: dyspnea on exertion, no chest pain, no orthopnea, no 

palpitations


Respiratory: shortness of breath, dyspnea on exertion


Gastrointestinal: abdominal pain, no nausea, no vomiting


Musculoskeletal: low back pain, no neck stiffness, no neck pain, no shooting arm

 pain





Physical Examination


                                   Vital Signs











Temp Pulse Resp BP Pulse Ox


 


 98.1 F   110 H  14   171/140   98 


 


 08/10/22 08:40  08/10/22 08:40  08/10/22 08:40  08/10/22 08:40  08/10/22 08:40











General appearance: no acute distress


Neck: Positive: trachea midline


Cardiac: Positive: Reg Rate and Rhythm


Lungs: Positive: Normal Breath Sounds


Neuro: Positive: Grossly Intact


Abdomen: Positive: Tender


Skin: Negative: Rash, Suspicious Lesions, Ulceration


Extremities: Present: upper extr. pulses.  Absent: edema





Results





                                 08/11/22 03:48





                                 08/11/22 03:48


                                 Cardiac Enzymes











  08/10/22 08/11/22 Range/Units





  19:20 03:48 


 


AST  815 H  516 H  (5-40)  units/L








                                       CBC











  08/11/22 Range/Units





  03:48 


 


WBC  7.1  (4.5-11.0)  K/mm3


 


RBC  4.32  (3.65-5.03)  M/mm3


 


Hgb  12.7  (10.1-14.3)  gm/dl


 


Hct  38.4  (30.3-42.9)  %


 


Plt Count  286  (140-440)  K/mm3


 


Lymph # (Auto)  2.4  (1.2-5.4)  K/mm3


 


Mono # (Auto)  0.7  (0.0-0.8)  K/mm3


 


Eos # (Auto)  0.1  (0.0-0.4)  K/mm3


 


Baso # (Auto)  0.0  (0.0-0.1)  K/mm3








                          Comprehensive Metabolic Panel











  08/10/22 08/11/22 Range/Units





  19:20 03:48 


 


Sodium   136 L  (137-145)  mmol/L


 


Potassium   4.1  (3.6-5.0)  mmol/L


 


Chloride   98.9  ()  mmol/L


 


Carbon Dioxide   26  (22-30)  mmol/L


 


BUN   17  (7-17)  mg/dL


 


Creatinine   0.8  (0.6-1.2)  mg/dL


 


Glucose   117 H  ()  mg/dL


 


Calcium   8.6  (8.4-10.2)  mg/dL


 


Direct Bilirubin  0.6 H   (0-0.2)  mg/dL


 


Indirect Bilirubin  0.9   mg/dL


 


AST  815 H  516 H  (5-40)  units/L


 


ALT  579 H  474 H  (7-56)  units/L


 


Alkaline Phosphatase  169 H  148 H  ()  units/L


 


Total Protein  6.4  5.8 L  (6.3-8.2)  g/dL


 


Albumin  3.5 L  3.3 L  (3.9-5)  g/dL














- Imaging and Cardiology


Echo: report reviewed


Cardiac cath: pending





EKG interpretations





- Telemetry


EKG Rhythm: Sinus Tachycardia





- EKG


Sinus rhythms and dysrhythmias: sinus tachycardia





Assessment and Plan





Patient is a 52-year-old female who reports a past medical history of 

degenerative joint disease, chronic back pain, and history of crack cocaine 

abuse(reports she has not used since October) who presents to the ED yesterday 

for complaint of weakness, dyspnea on exertion, and abdominal pain that has been

 going on for about a month.  


Acute HFrEF


Hypertension


Transaminitis


History of crack cocaine abuse





Echo 8/10/2022-EF 10 to 15%.  Severe global hypokinesis of LV.  Mild diastolic 

dysfunction is present impaired relaxation pattern.  Right ventricle is mildly d

ilated.  Right ventricle systolic function is normal.  Left atrium is moderately

 dilated.  Trace aortic regurgitation.  Mild mitral regurgitation.  Moderate to 

severe tricuspid regurgitation.  Trace to mild pulmonic regurgitation





Plan:


EKG shows sinus tach 106 no acute ischemic changes.  Troponin negative x1.  

Patient denies any complaint of chest pain next


Agree with Lasix 40 mg IV twice daily for diuresis.


Strict I&O's, daily weights, repeat BMP in the a.m. with close monitoring of 

renal function


Patient currently on carvedilol 12.5 mg p.o. twice daily, valsartan 160 mg p.o. 

daily


Will hold statin due to elevated LFTs


Due to severely decreased EF will plan for cardiac cath in the a.m.  Patient to 

be n.p.o. after midnight.


Plan of care discussed with patient who verbalized understanding and 

acknowledgment





Patient seen in conjunction with Dr. Asif who agrees with this plan of care








- Patient Problems


(1) HTN (hypertension)


Current Visit: Yes   Status: Acute   





(2) Transaminitis


Current Visit: Yes   Status: Acute   





(3) Acute exacerbation of CHF (congestive heart failure)


Current Visit: Yes   Status: Acute

## 2022-08-11 NOTE — PROGRESS NOTE
Assessment and Plan





- Patient Problems


(1) Acute exacerbation of CHF (congestive heart failure)


Current Visit: Yes   Status: Acute   


Plan to address problem: 


Echocardiogram done


Ejection fraction 10 to 15%


Cardiac cath in a.m.








(2) New onset of congestive heart failure


Current Visit: Yes   Status: Acute   


Plan to address problem: 


IV Lasix initiated.


Echocardiogram for ejection fraction and valve


Cardiology consult requested abnormalities and wall motion abnormalities








(3) Transaminitis


Current Visit: Yes   Status: Acute   


Plan to address problem: 


Possibly secondary to hepatic congestion


EF is 10 to 15%








(4) Advance care planning


Current Visit: Yes   Status: Acute   


Plan to address problem: 


Disease education conducted, care care plan discussed, diagnosis discussed and 

prognosis discussed. Patient acknowledged understanding with care plan.  +30 

minutes.             








(5) DVT prophylaxis


Current Visit: Yes   Status: Acute   





Subjective


Date of service: 08/11/22


Principal diagnosis: CHF exacerbation


Interval history: 


52-year-old  female with no significant past medical history comes in 

for increasing shortness of breath for 2 weeks.  Shortness of breath on minimal 

exertion and orthopnea present.  No chest pain.  Patient also has right upper 

quadrant pain.  Patient had cholecystectomy in the past and


Hysterectomy no fever or chills.  Patient was seen now for 10 minutes/3/20/2021 

3 years ago.  Not on any narcotics or opiates from previous 3 years ago.  No 

alcohol.








8/11/2022


Echo 8/10/2022-EF 10 to 15%.  Severe global hypokinesis of LV.  Mild diastolic 

dysfunction is present impaired relaxation pattern.  Right ventricle is mildly 

dilated.  Right ventricle systolic function is normal.  Left atrium is 

moderately dilated.  Trace aortic regurgitation.  Mild mitral regurgitation.  

Moderate to severe tricuspid regurgitation.  Trace to mild pulmonic 

regurgitation





For cardiac cath in a.m.








Objective





- Constitutional


Vitals: 


                               Vital Signs - 12hr











  08/11/22 08/11/22 08/11/22





  11:28 16:09 19:17


 


Temperature 98.0 F 97.3 F L 97.5 F L


 


Pulse Rate 86 75 74


 


Respiratory 18 18 18





Rate   


 


Blood Pressure 97/61  99/71


 


Blood Pressure  101/72 





[Left]   


 


O2 Sat by Pulse 93 100 91





Oximetry   














  08/11/22





  20:33


 


Temperature 


 


Pulse Rate 


 


Respiratory 





Rate 


 


Blood Pressure 


 


Blood Pressure 





[Left] 


 


O2 Sat by Pulse 91





Oximetry 











General appearance: Present: mild distress, well-nourished





- EENT


Eyes: PERRL, EOM intact


ENT: hearing intact, clear oral mucosa


Ears: bilateral: normal





- Neck


Neck: supple, normal ROM





- Respiratory


Respiratory effort: normal


Respiratory: bilateral: CTA





- Breasts


Breasts: normal





- Cardiovascular


Heart rate: 78


Rhythm: regular


Heart Sounds: Present: S1 & S2.  Absent: gallop, rub


Extremities: pulses intact, No edema, normal color, Full ROM





- Gastrointestinal


General gastrointestinal: Present: soft, non-tender, non-distended, normal bowel

 sounds





- Genitourinary


Female genitourinary: normal





- Integumentary


Integumentary: clear, warm, dry





- Musculoskeletal


Musculoskeletal: 1, strength equal bilaterally





- Neurologic


Neurologic: moves all extremities





- Psychiatric


Psychiatric: memory intact, appropriate mood/affect, intact judgment & insight





- Labs


CBC & Chem 7: 


                                 08/12/22 04:27





                                 08/12/22 04:27


Labs: 


                              Abnormal lab results











  08/11/22 08/11/22 08/11/22 Range/Units





  03:48 03:48 18:08 


 


Mono % (Auto)  10.0 H    (0.0-7.3)  %


 


Sodium   136 L   (137-145)  mmol/L


 


Glucose   117 H   ()  mg/dL


 


POC Glucose    111 H  ()  mg/dL


 


AST   516 H   (5-40)  units/L


 


ALT   474 H   (7-56)  units/L


 


Alkaline Phosphatase   148 H   ()  units/L


 


Total Protein   5.8 L   (6.3-8.2)  g/dL


 


Albumin   3.3 L   (3.9-5)  g/dL














HEART Score





- HEART Score


Age: 45-65


Risk factors: 1-2 risk factors


Troponin: 


                                        











Troponin T  < 0.010 ng/mL (0.00-0.029)   08/10/22  17:18    











Troponin: < normal limit





- Critical Actions


Critical Actions: 4-6 pts:12-16.6% risk of adverse cardiac event. Should be 

admitted

## 2022-08-12 LAB
APTT BLD: 24.5 SEC. (ref 24.2–36.6)
BASOPHILS # (AUTO): 0 K/MM3 (ref 0–0.1)
BASOPHILS NFR BLD AUTO: 0.5 % (ref 0–1.8)
BUN SERPL-MCNC: 16 MG/DL (ref 7–17)
BUN/CREAT SERPL: 18 %
CALCIUM SERPL-MCNC: 8.5 MG/DL (ref 8.4–10.2)
EOSINOPHIL # BLD AUTO: 0.3 K/MM3 (ref 0–0.4)
EOSINOPHIL NFR BLD AUTO: 4.3 % (ref 0–4.3)
HCT VFR BLD CALC: 40 % (ref 30.3–42.9)
HEMOLYSIS INDEX: 13
HGB BLD-MCNC: 13.5 GM/DL (ref 10.1–14.3)
INR PPP: 0.97 (ref 0.87–1.13)
LYMPHOCYTES # BLD AUTO: 2.4 K/MM3 (ref 1.2–5.4)
LYMPHOCYTES NFR BLD AUTO: 34.9 % (ref 13.4–35)
MCHC RBC AUTO-ENTMCNC: 34 % (ref 30–34)
MCV RBC AUTO: 89 FL (ref 79–97)
MONOCYTES # (AUTO): 0.8 K/MM3 (ref 0–0.8)
MONOCYTES % (AUTO): 12.4 % (ref 0–7.3)
PLATELET # BLD: 309 K/MM3 (ref 140–440)
RBC # BLD AUTO: 4.5 M/MM3 (ref 3.65–5.03)

## 2022-08-12 PROCEDURE — 4A023N7 MEASUREMENT OF CARDIAC SAMPLING AND PRESSURE, LEFT HEART, PERCUTANEOUS APPROACH: ICD-10-PCS | Performed by: INTERNAL MEDICINE

## 2022-08-12 PROCEDURE — B2111ZZ FLUOROSCOPY OF MULTIPLE CORONARY ARTERIES USING LOW OSMOLAR CONTRAST: ICD-10-PCS | Performed by: INTERNAL MEDICINE

## 2022-08-12 PROCEDURE — B2151ZZ FLUOROSCOPY OF LEFT HEART USING LOW OSMOLAR CONTRAST: ICD-10-PCS | Performed by: INTERNAL MEDICINE

## 2022-08-12 RX ADMIN — NITROGLYCERIN ONE MLS: 20 INJECTION INTRAVENOUS at 11:07

## 2022-08-12 RX ADMIN — Medication SCH: at 09:20

## 2022-08-12 RX ADMIN — NITROGLYCERIN ONE MLS: 20 INJECTION INTRAVENOUS at 10:52

## 2022-08-12 RX ADMIN — FAMOTIDINE SCH: 10 INJECTION, SOLUTION INTRAVENOUS at 09:20

## 2022-08-12 RX ADMIN — MIDAZOLAM ONE MG: 1 INJECTION INTRAMUSCULAR; INTRAVENOUS at 10:50

## 2022-08-12 RX ADMIN — FUROSEMIDE SCH MG: 10 INJECTION, SOLUTION INTRAVENOUS at 06:45

## 2022-08-12 RX ADMIN — FENTANYL CITRATE ONE MCG: 50 INJECTION, SOLUTION INTRAMUSCULAR; INTRAVENOUS at 11:05

## 2022-08-12 RX ADMIN — HEPARIN SODIUM SCH: 5000 INJECTION, SOLUTION INTRAVENOUS; SUBCUTANEOUS at 09:20

## 2022-08-12 RX ADMIN — HEPARIN SODIUM ONE UNIT: 1000 INJECTION, SOLUTION INTRAVENOUS; SUBCUTANEOUS at 10:51

## 2022-08-12 RX ADMIN — HEPARIN SODIUM SCH UNIT: 5000 INJECTION, SOLUTION INTRAVENOUS; SUBCUTANEOUS at 21:32

## 2022-08-12 RX ADMIN — VERAPAMIL HYDROCHLORIDE ONE MG: 2.5 INJECTION INTRAVENOUS at 10:51

## 2022-08-12 RX ADMIN — FENTANYL CITRATE ONE MCG: 50 INJECTION, SOLUTION INTRAMUSCULAR; INTRAVENOUS at 10:50

## 2022-08-12 RX ADMIN — VALSARTAN SCH: 160 TABLET, FILM COATED ORAL at 21:31

## 2022-08-12 RX ADMIN — SPIRONOLACTONE SCH MG: 25 TABLET ORAL at 14:53

## 2022-08-12 RX ADMIN — MORPHINE SULFATE PRN MG: 2 INJECTION, SOLUTION INTRAMUSCULAR; INTRAVENOUS at 12:57

## 2022-08-12 RX ADMIN — Medication SCH: at 21:32

## 2022-08-12 RX ADMIN — FAMOTIDINE SCH: 10 INJECTION, SOLUTION INTRAVENOUS at 21:32

## 2022-08-12 RX ADMIN — LIDOCAINE HYDROCHLORIDE ONE ML: 10 INJECTION, SOLUTION INFILTRATION; PERINEURAL at 11:05

## 2022-08-12 RX ADMIN — LIDOCAINE HYDROCHLORIDE ONE ML: 10 INJECTION, SOLUTION INFILTRATION; PERINEURAL at 10:50

## 2022-08-12 RX ADMIN — HEPARIN SODIUM ONE UNIT: 1000 INJECTION, SOLUTION INTRAVENOUS; SUBCUTANEOUS at 11:07

## 2022-08-12 RX ADMIN — VERAPAMIL HYDROCHLORIDE ONE MG: 2.5 INJECTION INTRAVENOUS at 11:07

## 2022-08-12 RX ADMIN — MIDAZOLAM ONE MG: 1 INJECTION INTRAMUSCULAR; INTRAVENOUS at 11:05

## 2022-08-12 RX ADMIN — MORPHINE SULFATE PRN MG: 2 INJECTION, SOLUTION INTRAMUSCULAR; INTRAVENOUS at 05:14

## 2022-08-12 NOTE — DISCHARGE SUMMARY
Providers





- Providers


Date of Admission: 


08/10/22 21:27





Date of discharge: 08/14/22


Attending physician: 


ROLO BUTTS





                                        





08/10/22 21:36


Consult to Physician [CONS] Routine 


   Comment: 


   Consulting Provider: NARCISA THEODORE


   Physician Instructions: 


   Reason For Exam: New onset CHF





08/12/22 11:19


Consult to Cardiac Rehabilitation [CONS] Routine 


   Reason For Exam: Cardiac Rehab Evaluation











Primary care physician: 


CARLITOS RODNEY








Hospitalization


Condition: Stable


Disposition: 01 HOME / SELF CARE / HOMELESS





- Discharge Diagnoses


(1) Acute exacerbation of CHF (congestive heart failure)


Status: Acute   





(2) New onset of congestive heart failure


Status: Acute   





(3) Transaminitis


Status: Acute   





(4) Advance care planning


Status: Acute   





(5) DVT prophylaxis


Status: Acute   





Core Measure Documentation





- Palliative Care


Palliative Care/ Comfort Measures: Not Applicable





- Core Measures


Any of the following diagnoses?: none





Exam





- Constitutional


Vitals: 


                                        











Temp Pulse Resp BP Pulse Ox


 


 98.2 F   79   16   113/74   91 


 


 08/12/22 04:33  08/12/22 11:43  08/12/22 04:33  08/12/22 11:39  08/12/22 13:00











General appearance: Present: no acute distress, well-nourished





- EENT


Eyes: Present: PERRL


ENT: hearing intact, clear oral mucosa





- Neck


Neck: Present: supple, normal ROM





- Respiratory


Respiratory effort: normal


Respiratory: bilateral: CTA





- Cardiovascular


Heart rate: 78


Rhythm: regular


Heart Sounds: Present: S1 & S2.  Absent: rub, click





- Extremities


Extremities: pulses symmetrical, No edema


Peripheral Pulses: within normal limits





- Abdominal


General gastrointestinal: Present: soft, non-tender, non-distended, normal bowel

 sounds


Female genitourinary: Present: normal





- Integumentary


Integumentary: Present: clear, warm, dry





- Musculoskeletal


Musculoskeletal: gait normal, strength equal bilaterally





- Psychiatric


Psychiatric: appropriate mood/affect, intact judgment & insight





- Neurologic


Neurologic: CNII-XII intact, moves all extremities





Plan


Activity: no restrictions


Diet: low fat, low cholesterol, low salt


Follow up with: 


CARLITOS RODNEY MD [Primary Care Provider] - 3-5 Days


CAMILO WILLIS MD [Staff Physician] - 7 Days


Prescriptions: 


Spironolactone [Aldactone] 25 mg PO QDAY 30 Days #30 tablet


carvediloL [Coreg] 12.5 mg PO BID 30 Days #60 tablet


Valsartan [Diovan] 160 mg PO Q24H 30 Days #30 tablet


Furosemide [Lasix TAB] 40 mg PO QDAY 30 Days #30 tablet

## 2022-08-12 NOTE — ELECTROCARDIOGRAPH REPORT
[facility]

                                       

Test Date:    2022               Test Time:    07:07:46

Pat Name:     NIKOLAY CABEZAS           Department:   

Patient ID:   [id number]          Room:         A475 1

Gender:       F                        Technician:   DONY

:          1970               Requested By: BARB DEXTER

Order Number: [id number]             Reading MD:   Joshua Asif

                                 Measurements

Intervals                              Axis          

Rate:         88                       P:            69

GA:           129                      QRS:          68

QRSD:         92                       T:            79

QT:           449                                    

QTc:          537                                    

                           Interpretive Statements

Sinus rhythm

Atrial premature complex

Right atrial enlargement

Left ventricular hypertrophy

Prolonged QT interval

Compared to ECG 08/10/2022 18:17:47

Atrial premature complex(es) now present

Left ventricular hypertrophy now present

Sinus tachycardia no longer present

Myocardial infarct finding no longer present

Electronically Signed On 2022 9:01:42 EDT by Joshua Asif

## 2022-08-12 NOTE — CARDIAC CATHERIZATION REPORT
DATE OF SERVICE: 08/12/2022



LEFT HEART CATHETERIZATION



CLINICAL INFORMATION:  This is a 52-year-old female with chest pain with severe 

LV dysfunction who is here for left heart catheterization for ischemic 

evaluation of cardiomyopathy.  Procedure was done with moderate sedation started

at 11:00, finished at 11:15 with 15 minutes of moderate sedation.



DESCRIPTION OF PROCEDURE:  Procedure was done via the right radial artery, 

sterile technique and local anesthesia.  A 6-Brazilian radial sheath inserted.  

Left system with JL3.5 catheter.  Left main is large and patent, bifurcates into

large LAD, is patent.  Diagonal 1, diagonal 2 medium caliber was patent.  

Circumflex is a large caliber vessel, dominant in the AV groove.  It is patent. 

OM1, OM2 and LPDA are medium caliber, patent.  RCA is a small nondominant 

vessel, patent.  LV gram done in [facility] and PETERS shows severe LV dysfunction, LVEDP 

of 5 mmHg, LV is 108/5, aortic is 108/50.  The 5-Brazilian catheters all taken over

a guidewire.  A 6-Brazilian radial sheath was discontinued.  Radial band applied.  

No hematoma, no bleeding.



SUMMARY:  Normal coronaries.  Left dominant system, severe LV dysfunction.  

Nonischemic cardiomyopathy.







DD: 08/12/2022 11:16 AM

DT: 08/12/2022 11:34 AM

TID: [id number] RECEIPT: [id number]

CARMELLA/ASHVIN

## 2022-08-12 NOTE — PROGRESS NOTE
Assessment and Plan





Patient is a 52-year-old female who reports a past medical history of 

degenerative joint disease, chronic back pain, and history of crack cocaine 

abuse(reports she has not used since October) who presents to the ED yesterday 

for complaint of weakness, dyspnea on exertion, and abdominal pain that has been

going on for about a month.  


Acute HFrEF


Hypertension


Transaminitis


History of crack cocaine abuse





Echo 8/10/2022-EF 10 to 15%.  Severe global hypokinesis of LV.  Mild diastolic 

dysfunction is present impaired relaxation pattern.  Right ventricle is mildly 

dilated.  Right ventricle systolic function is normal.  Left atrium is 

moderately dilated.  Trace aortic regurgitation.  Mild mitral regurgitation.  

Moderate to severe tricuspid regurgitation.  Trace to mild pulmonic 

regurgitation


Cardiac cath 8/12/2022-normal coronaries, left dominant system, severe LV 

dysfunction.  Nonischemic cardiomyopathy





Plan:


Cardiac cath this a.m. patient had normal coronaries


Will convert to Lasix 40 mg p.o. daily


Initiate Aldactone 25 mg p.o. daiyly


Continue carvedilol 12.5 mg p.o. twice daily, valsartan 160 mg p.o. daily


Will hold statin due to elevated LFTs


Plan of care discussed with patient who verbalized understanding and 

acknowledgment


Patient has verbalized that she may not be staying in [place] and may be moving 

recommend patient follow-up with cardiologist wherever she decides to move to or

she may follow-up with our group


Cardiac status otherwise stable for discharge








Patient may follow-up with our group, [facility] heart specialist, as an 

outpatient 1 to 2 weeks after discharge.  Phone #[phone]


Patient seen in conjunction with Dr. Asif who agrees with this plan of care








- Patient Problems


(1) HTN (hypertension)


Current Visit: Yes   Status: Acute   





(2) Transaminitis


Current Visit: Yes   Status: Acute   





(3) Acute exacerbation of CHF (congestive heart failure)


Current Visit: Yes   Status: Acute   





Subjective


Date of service: 08/12/22


Principal diagnosis: CHF exacerbation


Interval history: 





Patient for cardiac cath this a.m.


Sinus 70s to 80s on monitor with no events





Objective


                                   Vital Signs











  Temp Pulse Resp BP BP Pulse Ox


 


 08/12/22 11:43   79     87


 


 08/12/22 11:39     113/74  


 


 08/12/22 08:38   90   120/86   93


 


 08/12/22 04:33  98.2 F  88  16  118/77   90


 


 08/11/22 23:17  97.8 F  77  18  114/76   99


 


 08/11/22 21:00   74    


 


 08/11/22 20:33       91


 


 08/11/22 19:17  97.5 F L  74  18  99/71   91


 


 08/11/22 16:09  97.3 F L  75  18   101/72  100














- Physical Examination


General: No Apparent Distress


Neck: Positive: trachea midline


Cardiac: Positive: Reg Rate and Rhythm


Lungs: Positive: Normal Breath Sounds


Neuro: Positive: Grossly Intact


Abdomen: Positive: Tender


Skin: Negative: Rash, Suspicious Lesions, Ulceration


Extremities: Present: upper extr. pulses.  Absent: edema





- Labs and Meds


                                   Coagulation











  08/12/22 Range/Units





  04:27 


 


PT  14.0  (12.2-14.9)  Sec.


 


INR  0.97  (0.87-1.13)  


 


APTT  24.5  (24.2-36.6)  Sec.








                                       CBC











  08/12/22 Range/Units





  04:27 


 


WBC  6.8  (4.5-11.0)  K/mm3


 


RBC  4.50  (3.65-5.03)  M/mm3


 


Hgb  13.5  (10.1-14.3)  gm/dl


 


Hct  40.0  (30.3-42.9)  %


 


Plt Count  309  (140-440)  K/mm3


 


Lymph # (Auto)  2.4  (1.2-5.4)  K/mm3


 


Mono # (Auto)  0.8  (0.0-0.8)  K/mm3


 


Eos # (Auto)  0.3  (0.0-0.4)  K/mm3


 


Baso # (Auto)  0.0  (0.0-0.1)  K/mm3








                          Comprehensive Metabolic Panel











  08/12/22 Range/Units





  04:27 


 


Sodium  135 L  (137-145)  mmol/L


 


Potassium  4.8  (3.6-5.0)  mmol/L


 


Chloride  93.7 L  ()  mmol/L


 


Carbon Dioxide  35 H D  (22-30)  mmol/L


 


BUN  16  (7-17)  mg/dL


 


Creatinine  0.9  (0.6-1.2)  mg/dL


 


Glucose  100  ()  mg/dL


 


Calcium  8.5  (8.4-10.2)  mg/dL














- Imaging and Cardiology


EKG: report reviewed (Sinus tachycardia heart rate of 54 minutes)


Echo: report reviewed


Cardiac cath: report reviewed





- Telemetry


EKG Rhythm: Sinus Rhythm





- EKG


Sinus rhythms and dysrhythmias: sinus rhythm, sinus tachycardia

## 2022-08-12 NOTE — ELECTROCARDIOGRAPH REPORT
[facility]

                                       

Test Date:    2022-08-10               Test Time:    18:17:47

Pat Name:     NIKOLAY CABEZAS           Department:   

Patient ID:   [id number]          Room:         A475 1

Gender:       F                        Technician:   0000

:          1970               Requested By: GILSON SCHULER

Order Number: [id number]             Reading MD:   Joshua Asif

                                 Measurements

Intervals                              Axis          

Rate:         106                      P:            78

KS:           144                      QRS:          89

QRSD:         90                       T:            93

QT:           391                                    

QTc:          519                                    

                           Interpretive Statements

Sinus tachycardia

Probable left atrial enlargement

nonspecific st-t

No previous ECG available for comparison

Electronically Signed On 2022 8:50:19 EDT by Joshua Asif

## 2022-08-13 RX ADMIN — OXYCODONE AND ACETAMINOPHEN PRN TAB: 5; 325 TABLET ORAL at 10:32

## 2022-08-13 RX ADMIN — OXYCODONE AND ACETAMINOPHEN PRN TAB: 5; 325 TABLET ORAL at 21:42

## 2022-08-13 RX ADMIN — SPIRONOLACTONE SCH MG: 25 TABLET ORAL at 10:29

## 2022-08-13 RX ADMIN — VALSARTAN SCH MG: 160 TABLET, FILM COATED ORAL at 22:42

## 2022-08-13 RX ADMIN — Medication SCH ML: at 10:29

## 2022-08-13 RX ADMIN — HEPARIN SODIUM SCH UNIT: 5000 INJECTION, SOLUTION INTRAVENOUS; SUBCUTANEOUS at 10:29

## 2022-08-13 RX ADMIN — FAMOTIDINE SCH MG: 20 TABLET ORAL at 21:43

## 2022-08-13 RX ADMIN — HEPARIN SODIUM SCH UNIT: 5000 INJECTION, SOLUTION INTRAVENOUS; SUBCUTANEOUS at 23:43

## 2022-08-13 RX ADMIN — Medication SCH: at 09:45

## 2022-08-13 RX ADMIN — FUROSEMIDE SCH MG: 40 TABLET ORAL at 10:29

## 2022-08-13 RX ADMIN — Medication SCH: at 21:43

## 2022-08-13 RX ADMIN — FAMOTIDINE SCH: 10 INJECTION, SOLUTION INTRAVENOUS at 10:29

## 2022-08-14 ENCOUNTER — HOSPITAL ENCOUNTER (EMERGENCY)
Dept: HOSPITAL 5 - ED | Age: 52
LOS: 1 days | Discharge: LEFT BEFORE DISCHARGE | End: 2022-08-15
Payer: SELF-PAY

## 2022-08-14 VITALS — SYSTOLIC BLOOD PRESSURE: 111 MMHG | DIASTOLIC BLOOD PRESSURE: 71 MMHG

## 2022-08-14 VITALS — SYSTOLIC BLOOD PRESSURE: 107 MMHG | DIASTOLIC BLOOD PRESSURE: 73 MMHG

## 2022-08-14 DIAGNOSIS — R53.1: Primary | ICD-10-CM

## 2022-08-14 DIAGNOSIS — Z53.21: ICD-10-CM

## 2022-08-14 RX ADMIN — FAMOTIDINE SCH MG: 20 TABLET ORAL at 09:44

## 2022-08-14 RX ADMIN — HEPARIN SODIUM SCH: 5000 INJECTION, SOLUTION INTRAVENOUS; SUBCUTANEOUS at 09:45

## 2022-08-14 RX ADMIN — SPIRONOLACTONE SCH MG: 25 TABLET ORAL at 09:44

## 2022-08-14 RX ADMIN — Medication SCH: at 09:45

## 2022-08-14 RX ADMIN — FUROSEMIDE SCH MG: 40 TABLET ORAL at 09:45

## 2022-08-14 NOTE — PROGRESS NOTE
Assessment and Plan





- Patient Problems


(1) Acute exacerbation of CHF (congestive heart failure)


Current Visit: Yes   Status: Acute   





(2) New onset of congestive heart failure


Current Visit: Yes   Status: Acute   





(3) Transaminitis


Current Visit: Yes   Status: Acute   





(4) DVT prophylaxis


Current Visit: Yes   Status: Acute   





(5) Advance care planning


Current Visit: Yes   Status: Acute   





Subjective


Date of service: 08/13/22


Principal diagnosis: CHF exacerbation


Interval history: 


52-year-old  female with no significant past medical history comes in 

for increasing shortness of breath for 2 weeks.  Shortness of breath on minimal 

exertion and orthopnea present.  No chest pain.  Patient also has right upper 

quadrant pain.  Patient had cholecystectomy in the past and


Hysterectomy no fever or chills.  Patient was seen now for 10 minutes/3/20/2021 

3 years ago.  Not on any narcotics or opiates from previous 3 years ago.  No 

alcohol.








8/11/2022


Echo 8/10/2022-EF 10 to 15%.  Severe global hypokinesis of LV.  Mild diastolic 

dysfunction is present impaired relaxation pattern.  Right ventricle is mildly 

dilated.  Right ventricle systolic function is normal.  Left atrium is 

moderately dilated.  Trace aortic regurgitation.  Mild mitral regurgitation.  

Moderate to severe tricuspid regurgitation.  Trace to mild pulmonic 

regurgitation





For cardiac cath in a.m.





8/12/22


Cath normal


Nonischemic cardiomyopathy


Very low ejection fraction





8/13/2022


Patient was discharged yesterday


Could not be discharged because of social problems and noted to go


 and case management on board








Objective





- Constitutional


Vitals: 


                               Vital Signs - 12hr











  08/13/22 08/13/22 08/13/22





  19:18 21:42 22:00


 


Temperature 98.9 F  


 


Pulse Rate 86  


 


Respiratory 16 20 





Rate   


 


Respiratory   20





Rate [Right   





Chest]   


 


Blood Pressure 112/76  


 


O2 Sat by Pulse 96  96





Oximetry   














  08/13/22 08/14/22





  23:24 03:54


 


Temperature 98.2 F 97.7 F


 


Pulse Rate 91 H 99 H


 


Respiratory 18 18





Rate  


 


Respiratory  





Rate [Right  





Chest]  


 


Blood Pressure 139/70 113/75


 


O2 Sat by Pulse 97 99





Oximetry  











General appearance: Present: no acute distress, well-nourished





- EENT


Eyes: PERRL, EOM intact


ENT: hearing intact, clear oral mucosa


Ears: bilateral: normal





- Neck


Neck: supple, normal ROM





- Respiratory


Respiratory effort: normal


Respiratory: bilateral: CTA





- Breasts


Breasts: normal





- Cardiovascular


Heart rate: 78


Rhythm: regular


Heart Sounds: Present: S1 & S2.  Absent: gallop, rub


Extremities: pulses intact, No edema, normal color, Full ROM





- Gastrointestinal


General gastrointestinal: Present: soft, non-tender, non-distended, normal bowel

sounds





- Genitourinary


Female genitourinary: normal





- Integumentary


Integumentary: clear, warm, dry





- Musculoskeletal


Musculoskeletal: 1, strength equal bilaterally





- Neurologic


Neurologic: moves all extremities





- Psychiatric


Psychiatric: memory intact, appropriate mood/affect, intact judgment & insight





- Labs


CBC & Chem 7: 


                                 08/12/22 04:27





                                 08/12/22 04:27





HEART Score





- HEART Score


Age: 45-65


Risk factors: 1-2 risk factors


Troponin: 


                                        











Troponin T  < 0.010 ng/mL (0.00-0.029)   08/10/22  17:18    











Troponin: < normal limit





- Critical Actions


Critical Actions: 4-6 pts:12-16.6% risk of adverse cardiac event. Should be 

admitted

## 2022-08-14 NOTE — PROGRESS NOTE
Assessment and Plan





- Patient Problems


(1) Acute exacerbation of CHF (congestive heart failure)


Current Visit: Yes   Status: Acute   


Plan to address problem: 


Echocardiogram done


Ejection fraction 10 to 15%


Cardiac cath in a.m.








(2) New onset of congestive heart failure


Current Visit: Yes   Status: Acute   


Plan to address problem: 


IV Lasix initiated.


Echocardiogram for ejection fraction and valve


Cardiology consult requested abnormalities and wall motion abnormalities








(3) Transaminitis


Current Visit: Yes   Status: Acute   


Plan to address problem: 


Possibly secondary to hepatic congestion


EF is 10 to 15%








(4) DVT prophylaxis


Current Visit: Yes   Status: Acute   


Plan to address problem: 


On heparin and GI prophylaxis








(5) Advance care planning


Current Visit: Yes   Status: Acute   


Plan to address problem: 


Disease education conducted, care care plan discussed, diagnosis discussed and p

rognosis discussed. Patient acknowledged understanding with care plan.  +30 

minutes.             








Subjective


Date of service: 08/12/22


Principal diagnosis: CHF exacerbation


Interval history: 


52-year-old  female with no significant past medical history comes in 

for increasing shortness of breath for 2 weeks.  Shortness of breath on minimal 

exertion and orthopnea present.  No chest pain.  Patient also has right upper 

quadrant pain.  Patient had cholecystectomy in the past and


Hysterectomy no fever or chills.  Patient was seen now for 10 minutes/3/20/2021 

3 years ago.  Not on any narcotics or opiates from previous 3 years ago.  No 

alcohol.








8/11/2022


Echo 8/10/2022-EF 10 to 15%.  Severe global hypokinesis of LV.  Mild diastolic 

dysfunction is present impaired relaxation pattern.  Right ventricle is mildly 

dilated.  Right ventricle systolic function is normal.  Left atrium is 

moderately dilated.  Trace aortic regurgitation.  Mild mitral regurgitation.  

Moderate to severe tricuspid regurgitation.  Trace to mild pulmonic 

regurgitation





For cardiac cath in a.m.





8/12/22


Cath normal


Nonischemic cardiomyopathy


Very low ejection fraction








Objective





- Constitutional


Vitals: 


                               Vital Signs - 12hr











  08/13/22 08/13/22 08/13/22





  19:18 21:42 22:00


 


Temperature 98.9 F  


 


Pulse Rate 86  


 


Respiratory 16 20 





Rate   


 


Respiratory   20





Rate [Right   





Chest]   


 


Blood Pressure 112/76  


 


O2 Sat by Pulse 96  96





Oximetry   














  08/13/22 08/14/22





  23:24 03:54


 


Temperature 98.2 F 97.7 F


 


Pulse Rate 91 H 99 H


 


Respiratory 18 18





Rate  


 


Respiratory  





Rate [Right  





Chest]  


 


Blood Pressure 139/70 113/75


 


O2 Sat by Pulse 97 99





Oximetry  











General appearance: Present: no acute distress, well-nourished





- EENT


Eyes: PERRL, EOM intact


ENT: hearing intact, clear oral mucosa


Ears: bilateral: normal





- Neck


Neck: supple, normal ROM





- Respiratory


Respiratory effort: normal


Respiratory: bilateral: CTA





- Breasts


Breasts: normal





- Cardiovascular


Heart rate: 78


Rhythm: regular


Heart Sounds: Present: S1 & S2.  Absent: gallop, rub


Extremities: pulses intact, No edema, normal color, Full ROM





- Gastrointestinal


General gastrointestinal: Present: soft, non-tender, non-distended, normal bowel

sounds





- Genitourinary


Female genitourinary: normal





- Integumentary


Integumentary: clear, warm, dry





- Musculoskeletal


Musculoskeletal: 1, strength equal bilaterally





- Neurologic


Neurologic: moves all extremities





- Psychiatric


Psychiatric: memory intact, appropriate mood/affect, intact judgment & insight





- Labs


CBC & Chem 7: 


                                 08/12/22 04:27





                                 08/12/22 04:27





HEART Score





- HEART Score


Age: 45-65


Risk factors: 1-2 risk factors


Troponin: 


                                        











Troponin T  < 0.010 ng/mL (0.00-0.029)   08/10/22  17:18    











Troponin: < normal limit





- Critical Actions


Critical Actions: 4-6 pts:12-16.6% risk of adverse cardiac event. Should be 

admitted